# Patient Record
Sex: MALE | Race: WHITE
[De-identification: names, ages, dates, MRNs, and addresses within clinical notes are randomized per-mention and may not be internally consistent; named-entity substitution may affect disease eponyms.]

---

## 2019-01-17 ENCOUNTER — HOSPITAL ENCOUNTER (EMERGENCY)
Dept: HOSPITAL 56 - MW.ED | Age: 54
Discharge: NURSING, REHAB OR LONG-TERM CARE | End: 2019-01-17
Payer: COMMERCIAL

## 2019-01-17 DIAGNOSIS — I48.91: Primary | ICD-10-CM

## 2019-01-17 DIAGNOSIS — Z79.84: ICD-10-CM

## 2019-01-17 DIAGNOSIS — Z91.14: ICD-10-CM

## 2019-01-17 DIAGNOSIS — E11.9: ICD-10-CM

## 2019-01-17 DIAGNOSIS — I10: ICD-10-CM

## 2019-01-17 DIAGNOSIS — R79.89: ICD-10-CM

## 2019-01-17 DIAGNOSIS — Z79.82: ICD-10-CM

## 2019-01-17 DIAGNOSIS — Z79.899: ICD-10-CM

## 2019-01-17 LAB
CHLORIDE SERPL-SCNC: 106 MMOL/L (ref 98–107)
SODIUM SERPL-SCNC: 142 MMOL/L (ref 136–148)

## 2019-01-17 PROCEDURE — 84484 ASSAY OF TROPONIN QUANT: CPT

## 2019-01-17 PROCEDURE — 99285 EMERGENCY DEPT VISIT HI MDM: CPT

## 2019-01-17 PROCEDURE — 85025 COMPLETE CBC W/AUTO DIFF WBC: CPT

## 2019-01-17 PROCEDURE — 96374 THER/PROPH/DIAG INJ IV PUSH: CPT

## 2019-01-17 PROCEDURE — 96361 HYDRATE IV INFUSION ADD-ON: CPT

## 2019-01-17 PROCEDURE — 85610 PROTHROMBIN TIME: CPT

## 2019-01-17 PROCEDURE — 71045 X-RAY EXAM CHEST 1 VIEW: CPT

## 2019-01-17 PROCEDURE — 93005 ELECTROCARDIOGRAM TRACING: CPT

## 2019-01-17 PROCEDURE — 96372 THER/PROPH/DIAG INJ SC/IM: CPT

## 2019-01-17 PROCEDURE — 36415 COLL VENOUS BLD VENIPUNCTURE: CPT

## 2019-01-17 PROCEDURE — 80053 COMPREHEN METABOLIC PANEL: CPT

## 2019-01-17 NOTE — EDM.PDOC
ED HPI GENERAL MEDICAL PROBLEM





- General


Chief Complaint: Cardiovascular Problem


Stated Complaint: CHEST TIGHTNESS


Time Seen by Provider: 01/17/19 18:54


Source of Information: Reports: Patient


History Limitations: Reports: No Limitations





- History of Present Illness


INITIAL COMMENTS - FREE TEXT/NARRATIVE: 


HISTORY AND PHYSICAL:





History of present illness:


Patient is a 53-year-old male who presents to the emergency room today with 

complaints of irregular heart rate. He states that he was diagnosed with atrial 

fibrillation approximately for 5 years ago which did result in a stroke. He 

states that he had a significant weight loss after this incident, followed by 

gastric bypass. He states he stopped taking any anti-arrhythmia medications. "

My doctors aware... But I think I was supposed to be taking those". States he 

was taking Coumadin but has not taken this in 2-3 years. He currently takes 

medications for type 2 diabetes and hypertension. Patient reports that he felt 

his heart racing on Tuesday. He felt dizzy and near syncopal, had gone home to 

rest. He missed work on Wednesday, but did not have any symptoms. This evening 

symptoms have returned. He again felt dizzy and near syncopal. 





He denies any fever, chills, chest pain, shortness of breath or cough. Denies 

any abdominal pain, nausea, vomiting, diarrhea, constipation or dysuria. He has 

been able to eat and drink appropriately.





Review of systems: 


As per history of present illness and below otherwise all systems reviewed and 

negative.





Past medical history: 


As per history of present illness and as reviewed below otherwise 

noncontributory.





Surgical history: 


As per history of present illness and as reviewed below otherwise 

noncontributory.





Social history: 


See social history for further information





Family history: 


As per history of present illness and as reviewed below otherwise 

noncontributory.





Physical exam:


General: Well developed and well nourished 53-year-old male. Alert and 

oriented. Nontoxic appearing and in no acute distress.


HEENT: Atraumatic, normocephalic, pupils equal and reactive bilaterally, 

negative for conjunctival pallor or scleral icterus, mucous membranes moist, 

TMs normal bilaterally, throat clear, neck supple, nontender, trachea midline. 

No drooling or trismus noted. No meningeal signs. No hot potato voice noted. 


Lungs: Clear to auscultation, breath sounds equal bilaterally, chest nontender.


Heart: Irregular rate and rhythm


Abdomen: Soft, nondistended, nontender. Negative for masses or 

hepatosplenomegaly. Negative for costovertebral tenderness.


Pelvis: Stable nontender.


Genitourinary: Deferred.


Rectal: Deferred.


Skin: Intact, warm, dry. No lesions or rashes noted.


Extremities: Atraumatic, moves all per self, negative for cords or calf pain. 

Neurovascular unremarkable.


Neuro: Awake, alert, oriented. Cranial nerves II through XII unremarkable. 

Cerebellum unremarkable. Motor and sensory unremarkable throughout. Exam 

nonfocal.





Notes:


Cardizem was given and patient's rate ranges 's. Patient's troponin is 

0.258 (H). This information was shared with the patient. Unfortunately we 

currently have no beds available. Patient will need to be transferred to the 

closest facility, [facility] in [place] for continued evaluation and monitoring. 

Patient was made aware of this. He is hesitant as he states he would prefer to 

drive himself there. He is aware of the risks of this. He will be transferred 

via ground EMS.





Dr Guerrero, [facility] MD, was consulted on this patient and is agreeable to 

accepting this patient. 





Diagnostics:


CBC, CMP, troponin, PT/INR, chest x-ray, EKG





Therapeutics:


IV fluid, Cardizem IV, Lovenox





Impression: 


Atrial Fibrillation


Elevated Troponin


Medication noncompliance





Plan:


Transfer to [place] via ground EMS 





Definitive disposition and diagnosis as appropriate pending reevaluation and 

review of above.








- Related Data


 Allergies











Allergy/AdvReac Type Severity Reaction Status Date / Time


 


No Known Allergies Allergy   Verified 06/03/14 07:53











Home Meds: 


 Home Meds





Lisinopril 40 mg PO DAILY 06/03/14 [History]


metFORMIN [metFORMIN XR] 500 mg PO BID 06/03/14 [History]


Metoprolol Succinate 50 mg PO DAILY 08/13/14 [History]


Aspirin 81 mg PO DAILY 01/17/19 [History]


amLODIPine [Norvasc] 2.5 mg PO DAILY 01/17/19 [History]


atorvaSTATin [Lipitor] 20 mg PO BEDTIME 01/17/19 [History]











ED ROS GENERAL





- Review of Systems


Review Of Systems: ROS reveals no pertinent complaints other than HPI.





ED EXAM, GENERAL





- Physical Exam


Exam: See Below (See dictation)





Course





- Vital Signs


Last Recorded V/S: 


 Last Vital Signs











Temp  97.8 F   01/17/19 18:45


 


Pulse  180 H  01/17/19 19:41


 


Resp  16   01/17/19 19:41


 


BP  101/76   01/17/19 19:41


 


Pulse Ox  96   01/17/19 19:41














- Orders/Labs/Meds


Orders: 


 Active Orders 24 hr











 Category Date Time Status


 


 EKG Documentation Completion [RC] STAT Care  01/17/19 18:50 Active


 


 EKG Documentation Completion [RC] STAT Care  01/17/19 19:57 Active


 


 Sodium Chloride 0.9% [Normal Saline] 1,000 ml Med  01/17/19 19:56 Active





 IV STAT   








 Medication Orders





Sodium Chloride (Normal Saline)  1,000 mls @ 999 mls/hr IV STAT ONE


   Stop: 01/17/19 20:56








Labs: 


 Laboratory Tests











  01/17/19 01/17/19 01/17/19 Range/Units





  19:10 19:10 19:10 


 


WBC  10.16    (4.0-11.0)  K/uL


 


RBC  5.37    (4.50-5.90)  M/uL


 


Hgb  14.4    (13.0-17.0)  g/dL


 


Hct  43.6    (38.0-50.0)  %


 


MCV  81.2    (80.0-98.0)  fL


 


MCH  26.8 L    (27.0-32.0)  pg


 


MCHC  33.0    (31.0-37.0)  g/dL


 


RDW Std Deviation  40.7    (28.0-62.0)  fl


 


RDW Coeff of Darrel  14    (11.0-15.0)  %


 


Plt Count  268    (150-400)  K/uL


 


MPV  10.10    (7.40-12.00)  fL


 


Neut % (Auto)  50.0    (48.0-80.0)  %


 


Lymph % (Auto)  37.1    (16.0-40.0)  %


 


Mono % (Auto)  9.4    (0.0-15.0)  %


 


Eos % (Auto)  3.1    (0.0-7.0)  %


 


Baso % (Auto)  0.4    (0.0-1.5)  %


 


Neut # (Auto)  5.1    (1.4-5.7)  K/uL


 


Lymph # (Auto)  3.8 H    (0.6-2.4)  K/uL


 


Mono # (Auto)  1.0 H    (0.0-0.8)  K/uL


 


Eos # (Auto)  0.3    (0.0-0.7)  K/uL


 


Baso # (Auto)  0.0    (0.0-0.1)  K/uL


 


Nucleated RBC %  0.0    /100WBC


 


Nucleated RBCs #  0    K/uL


 


INR   1.05   


 


Sodium    142  (136-148)  mmol/L


 


Potassium    3.9  (3.5-5.1)  mmol/L


 


Chloride    106  ()  mmol/L


 


Carbon Dioxide    27.0  (21.0-32.0)  mmol/L


 


BUN    25 H  (7.0-18.0)  mg/dL


 


Creatinine    1.4 H  (0.8-1.3)  mg/dL


 


Est Cr Clr Drug Dosing    TNP  


 


Estimated GFR (MDRD)    52.8  ml/min


 


Glucose    117 H  ()  mg/dL


 


Calcium    9.1  (8.5-10.1)  mg/dL


 


Total Bilirubin    0.3  (0.2-1.0)  mg/dL


 


AST    20  (15-37)  IU/L


 


ALT    34  (14-63)  IU/L


 


Alkaline Phosphatase    59  ()  U/L


 


Troponin I    0.258 H*  (0.000-0.056)  ng/mL


 


Total Protein    7.2  (6.4-8.2)  g/dL


 


Albumin    3.9  (3.4-5.0)  g/dL


 


Globulin    3.3  (2.6-4.0)  g/dL


 


Albumin/Globulin Ratio    1.2  (0.9-1.6)  











Meds: 


Medications











Generic Name Dose Route Start Last Admin





  Trade Name Freq  PRN Reason Stop Dose Admin


 


Sodium Chloride  1,000 mls @ 999 mls/hr  01/17/19 19:56  





  Normal Saline  IV  01/17/19 20:56  





  STAT ONE   





     





     





     





     














Discontinued Medications














Generic Name Dose Route Start Last Admin





  Trade Name Freq  PRN Reason Stop Dose Admin


 


Aspirin  324 mg  01/17/19 19:46  





  Aspirin  PO  01/17/19 19:47  





  ONETIME ONE   





     





     





     





     


 


Diltiazem HCl  20 mg  01/17/19 18:54  01/17/19 19:36





  Diltiazem  IVPUSH  01/17/19 18:55  20 mg





  ONETIME ONE   Administration





     





     





     





     


 


Enoxaparin Sodium  100 mg  01/17/19 19:56  





  Lovenox  SUBCUT  01/17/19 19:57  





  ONETIME ONE   





     





     





     





     


 


Sodium Chloride  1,000 mls @ 999 mls/hr  01/17/19 18:50  01/17/19 19:35





  Normal Saline  IV  01/17/19 19:50  999 mls/hr





  STAT ONE   Administration





     





     





     





     














Departure





- Departure


Time of Disposition: 20:03


Disposition: [facility] to [facility]


Reason for Transfer *Q: Other (On diversion and cardiology referral)


Clinical Impression: 


 Atrial fibrillation with RVR, Elevated troponin, Noncompliance with medication 

regimen





Forms:  ED Department Discharge





- My Orders


Last 24 Hours: 


My Active Orders





01/17/19 18:50


EKG Documentation Completion [RC] STAT 





01/17/19 19:56


Sodium Chloride 0.9% [Normal Saline] 1,000 ml IV STAT 





01/17/19 19:57


EKG Documentation Completion [RC] STAT 














- Assessment/Plan


Last 24 Hours: 


My Active Orders





01/17/19 18:50


EKG Documentation Completion [RC] STAT 





01/17/19 19:56


Sodium Chloride 0.9% [Normal Saline] 1,000 ml IV STAT 





01/17/19 19:57


EKG Documentation Completion [RC] STAT

## 2019-01-17 NOTE — CR
INDICATION: chest pain



TECHNIQUE:



Chest 1 view. 



COMPARISON:



None. 



FINDINGS:



Cardiovascular and mediastinum: Heart size and vasculature are normal in 

caliber and appearance.  Mediastinum is within normal limits.  



Lungs and pleural space: Lungs are clear.  No sign of infiltrate or mass.  

No sign of pleural effusion.  No pneumothorax.  



Bones and soft tissues: No significant findings.  



IMPRESSION:



Unremarkable chest.



Dictated by: Nii Barnard MD @ 01/17/2019 19:40:05



(Electronically Signed)